# Patient Record
Sex: FEMALE
[De-identification: names, ages, dates, MRNs, and addresses within clinical notes are randomized per-mention and may not be internally consistent; named-entity substitution may affect disease eponyms.]

---

## 2019-06-18 ENCOUNTER — HOSPITAL ENCOUNTER (INPATIENT)
Dept: HOSPITAL 90 - EDH | Age: 54
LOS: 4 days | Discharge: HOME | DRG: 419 | End: 2019-06-22
Attending: FAMILY MEDICINE | Admitting: FAMILY MEDICINE
Payer: OTHER GOVERNMENT

## 2019-06-18 VITALS — DIASTOLIC BLOOD PRESSURE: 70 MMHG | SYSTOLIC BLOOD PRESSURE: 140 MMHG

## 2019-06-18 VITALS — WEIGHT: 160 LBS | BODY MASS INDEX: 30.21 KG/M2 | HEIGHT: 61 IN

## 2019-06-18 DIAGNOSIS — Z83.3: ICD-10-CM

## 2019-06-18 DIAGNOSIS — Z82.49: ICD-10-CM

## 2019-06-18 DIAGNOSIS — Z80.3: ICD-10-CM

## 2019-06-18 DIAGNOSIS — K80.10: Primary | ICD-10-CM

## 2019-06-18 DIAGNOSIS — I10: ICD-10-CM

## 2019-06-18 DIAGNOSIS — Z82.3: ICD-10-CM

## 2019-06-18 DIAGNOSIS — K59.00: ICD-10-CM

## 2019-06-18 DIAGNOSIS — E03.9: ICD-10-CM

## 2019-06-18 LAB
ALBUMIN SERPL-MCNC: 4 G/DL (ref 3.5–5)
ALT SERPL-CCNC: 269 U/L (ref 12–78)
AMYLASE SERPL-CCNC: 48 U/L (ref 25–115)
AST SERPL-CCNC: 188 U/L (ref 10–37)
BASOPHILS NFR BLD AUTO: 0.4 % (ref 0–5)
BILIRUB SERPL-MCNC: 1.1 MG/DL (ref 0.2–1)
BUN SERPL-MCNC: 18 MG/DL (ref 7–18)
CHLORIDE SERPL-SCNC: 105 MMOL/L (ref 101–111)
CO2 SERPL-SCNC: 29 MMOL/L (ref 21–32)
CREAT SERPL-MCNC: 0.6 MG/DL (ref 0.5–1.5)
EOSINOPHIL NFR BLD AUTO: 1.8 % (ref 0–8)
ERYTHROCYTE [DISTWIDTH] IN BLOOD BY AUTOMATED COUNT: 14.1 % (ref 11–15.5)
GFR SERPL CREATININE-BSD FRML MDRD: 112 ML/MIN (ref 60–?)
GLUCOSE SERPL-MCNC: 100 MG/DL (ref 70–105)
HCT VFR BLD AUTO: 41.8 % (ref 36–48)
LIPASE SERPL-CCNC: 147 U/L (ref 114–286)
LYMPHOCYTES NFR SPEC AUTO: 24.6 % (ref 21–51)
MCH RBC QN AUTO: 30 PG (ref 27–33)
MCHC RBC AUTO-ENTMCNC: 33.8 G/DL (ref 32–36)
MCV RBC AUTO: 89 FL (ref 79–99)
MONOCYTES NFR BLD AUTO: 5.5 % (ref 3–13)
NEUTROPHILS NFR BLD AUTO: 67.7 % (ref 40–77)
NRBC BLD MANUAL-RTO: 0.1 % (ref 0–0.19)
PH UR STRIP: 5 [PH] (ref 5–8)
PLATELET # BLD AUTO: 201 K/UL (ref 130–400)
POTASSIUM SERPL-SCNC: 3.8 MMOL/L (ref 3.5–5.1)
PROT SERPL-MCNC: 7 G/DL (ref 6–8.3)
RBC # BLD AUTO: 4.7 MIL/UL (ref 4–5.5)
SODIUM SERPL-SCNC: 143 MMOL/L (ref 136–145)
SP GR UR STRIP: 1.01 (ref 1–1.03)
UROBILINOGEN UR STRIP-MCNC: 0.2 MG/DL (ref 0.2–1)
WBC # BLD AUTO: 4.3 K/UL (ref 4.8–10.8)

## 2019-06-18 PROCEDURE — 85610 PROTHROMBIN TIME: CPT

## 2019-06-18 PROCEDURE — 48400 INJECTION INTRAOP ADD-ON: CPT

## 2019-06-18 PROCEDURE — 36415 COLL VENOUS BLD VENIPUNCTURE: CPT

## 2019-06-18 PROCEDURE — 93005 ELECTROCARDIOGRAM TRACING: CPT

## 2019-06-18 PROCEDURE — 82150 ASSAY OF AMYLASE: CPT

## 2019-06-18 PROCEDURE — 81003 URINALYSIS AUTO W/O SCOPE: CPT

## 2019-06-18 PROCEDURE — 85025 COMPLETE CBC W/AUTO DIFF WBC: CPT

## 2019-06-18 PROCEDURE — 80053 COMPREHEN METABOLIC PANEL: CPT

## 2019-06-18 PROCEDURE — 71046 X-RAY EXAM CHEST 2 VIEWS: CPT

## 2019-06-18 PROCEDURE — 82948 REAGENT STRIP/BLOOD GLUCOSE: CPT

## 2019-06-18 PROCEDURE — 83690 ASSAY OF LIPASE: CPT

## 2019-06-18 PROCEDURE — 85730 THROMBOPLASTIN TIME PARTIAL: CPT

## 2019-06-18 PROCEDURE — 76705 ECHO EXAM OF ABDOMEN: CPT

## 2019-06-18 PROCEDURE — 74181 MRI ABDOMEN W/O CONTRAST: CPT

## 2019-06-18 PROCEDURE — 85027 COMPLETE CBC AUTOMATED: CPT

## 2019-06-18 RX ADMIN — SODIUM CHLORIDE SCH MLS/HR: 0.9 INJECTION, SOLUTION INTRAVENOUS at 21:53

## 2019-06-19 VITALS — DIASTOLIC BLOOD PRESSURE: 67 MMHG | SYSTOLIC BLOOD PRESSURE: 127 MMHG

## 2019-06-19 VITALS — DIASTOLIC BLOOD PRESSURE: 67 MMHG | SYSTOLIC BLOOD PRESSURE: 137 MMHG

## 2019-06-19 VITALS — SYSTOLIC BLOOD PRESSURE: 159 MMHG | DIASTOLIC BLOOD PRESSURE: 83 MMHG

## 2019-06-19 VITALS — SYSTOLIC BLOOD PRESSURE: 132 MMHG | DIASTOLIC BLOOD PRESSURE: 68 MMHG

## 2019-06-19 VITALS — DIASTOLIC BLOOD PRESSURE: 83 MMHG | SYSTOLIC BLOOD PRESSURE: 148 MMHG

## 2019-06-19 VITALS — DIASTOLIC BLOOD PRESSURE: 75 MMHG | SYSTOLIC BLOOD PRESSURE: 129 MMHG

## 2019-06-19 LAB
ALBUMIN SERPL-MCNC: 3.5 G/DL (ref 3.5–5)
ALT SERPL-CCNC: 297 U/L (ref 12–78)
APTT PPP: 28.4 SEC (ref 26.3–35.5)
AST SERPL-CCNC: 156 U/L (ref 10–37)
BILIRUB SERPL-MCNC: 0.7 MG/DL (ref 0.2–1)
BUN SERPL-MCNC: 15 MG/DL (ref 7–18)
CHLORIDE SERPL-SCNC: 106 MMOL/L (ref 101–111)
CO2 SERPL-SCNC: 27 MMOL/L (ref 21–32)
CREAT SERPL-MCNC: 0.7 MG/DL (ref 0.5–1.5)
ERYTHROCYTE [DISTWIDTH] IN BLOOD BY AUTOMATED COUNT: 14.6 % (ref 11–15.5)
GFR SERPL CREATININE-BSD FRML MDRD: 93 ML/MIN (ref 60–?)
GLUCOSE SERPL-MCNC: 83 MG/DL (ref 70–105)
HCT VFR BLD AUTO: 38.7 % (ref 36–48)
INR PPP: 1.03 (ref 0.85–1.15)
MCH RBC QN AUTO: 29.6 PG (ref 27–33)
MCHC RBC AUTO-ENTMCNC: 33.5 G/DL (ref 32–36)
MCV RBC AUTO: 88.2 FL (ref 79–99)
NRBC BLD MANUAL-RTO: 0 % (ref 0–0.19)
PLATELET # BLD AUTO: 202 K/UL (ref 130–400)
POTASSIUM SERPL-SCNC: 3.5 MMOL/L (ref 3.5–5.1)
PROT SERPL-MCNC: 6.6 G/DL (ref 6–8.3)
PROTHROMBIN TIME: 10.8 SEC (ref 9.6–11.6)
RBC # BLD AUTO: 4.39 MIL/UL (ref 4–5.5)
SODIUM SERPL-SCNC: 142 MMOL/L (ref 136–145)
WBC # BLD AUTO: 3.6 K/UL (ref 4.8–10.8)

## 2019-06-19 RX ADMIN — DEXTROSE AND SODIUM CHLORIDE SCH MLS/HR: 5; .45 INJECTION, SOLUTION INTRAVENOUS at 17:23

## 2019-06-19 RX ADMIN — METRONIDAZOLE SCH MLS/HR: 500 SOLUTION INTRAVENOUS at 17:22

## 2019-06-19 RX ADMIN — SODIUM CHLORIDE SCH MLS/HR: 0.9 INJECTION, SOLUTION INTRAVENOUS at 01:30

## 2019-06-19 RX ADMIN — SODIUM CHLORIDE SCH MLS/HR: 0.9 INJECTION, SOLUTION INTRAVENOUS at 20:30

## 2019-06-19 RX ADMIN — SODIUM CHLORIDE SCH MLS/HR: 0.9 INJECTION, SOLUTION INTRAVENOUS at 20:35

## 2019-06-19 NOTE — NUR
PA OF DR. MOONEY (JONATHON), NOTIFIED OF PT HAVING PAIN

STATES, "OKAY WILL NOTIFY DR. DUNHAM OF PT HAVING PAIN AND WILL ASSESS PT TONIGHT AFTER DONE 
WITH SURGERIES.

## 2019-06-19 NOTE — NUR
PT COMPLAINING OF ABD PAIN 8/10, DR. ANY BAIN, STATES;

TO ODELL DUNHAM OF PT HAVING PAIN, AND DONT DISCHARGE PATIENT TODAY, PT IS HAVING PAIN 
AND IS NPO.

## 2019-06-19 NOTE — NUR
INITIAL

MET W PT, FAMILY AT B/SIDE PT IN PAIN, ADMITTED WITH CHOLELITHIASIS, SURG CONSULT PENDING, 

INDP,NO DME, UNEMPLOYED, LIVES W FAMILY/SPOUSE, DCP IS HOME 

-------------------------------------------------------------------------------

Addendum: 06/22/19 at 0814 by RICKEY BECKMAN RN 

-------------------------------------------------------------------------------

Amended: Links added.

## 2019-06-19 NOTE — NUR
DR. DUNHAM'S PA (KATHRINE IRWIN) CALLED BACK

STATES WILL NOT DO SURGERY AND TO FOLLOW UP THIS FRIDAY FOR POSSIBLE OUT PATIENT SX. MAY 
START PT ON A REGULAR LOW FAT DIET.

## 2019-06-20 VITALS — SYSTOLIC BLOOD PRESSURE: 146 MMHG | DIASTOLIC BLOOD PRESSURE: 84 MMHG

## 2019-06-20 VITALS — SYSTOLIC BLOOD PRESSURE: 126 MMHG | DIASTOLIC BLOOD PRESSURE: 78 MMHG

## 2019-06-20 VITALS — DIASTOLIC BLOOD PRESSURE: 72 MMHG | SYSTOLIC BLOOD PRESSURE: 136 MMHG

## 2019-06-20 VITALS — DIASTOLIC BLOOD PRESSURE: 78 MMHG | SYSTOLIC BLOOD PRESSURE: 138 MMHG

## 2019-06-20 VITALS — DIASTOLIC BLOOD PRESSURE: 83 MMHG | SYSTOLIC BLOOD PRESSURE: 138 MMHG

## 2019-06-20 VITALS — DIASTOLIC BLOOD PRESSURE: 72 MMHG | SYSTOLIC BLOOD PRESSURE: 134 MMHG

## 2019-06-20 LAB
ALBUMIN SERPL-MCNC: 3.7 G/DL (ref 3.5–5)
ALT SERPL-CCNC: 199 U/L (ref 12–78)
AST SERPL-CCNC: 53 U/L (ref 10–37)
BILIRUB SERPL-MCNC: 1.1 MG/DL (ref 0.2–1)
BUN SERPL-MCNC: 11 MG/DL (ref 7–18)
CHLORIDE SERPL-SCNC: 105 MMOL/L (ref 101–111)
CO2 SERPL-SCNC: 27 MMOL/L (ref 21–32)
CREAT SERPL-MCNC: 0.7 MG/DL (ref 0.5–1.5)
EOSINOPHIL NFR BLD MANUAL: 4 % (ref 1–6)
ERYTHROCYTE [DISTWIDTH] IN BLOOD BY AUTOMATED COUNT: 14 % (ref 11–15.5)
ERYTHROCYTE [DISTWIDTH] IN BLOOD BY AUTOMATED COUNT: 14.3 % (ref 11–15.5)
GFR SERPL CREATININE-BSD FRML MDRD: 93 ML/MIN (ref 60–?)
GLUCOSE SERPL-MCNC: 159 MG/DL (ref 70–105)
HCT VFR BLD AUTO: 39.5 % (ref 36–48)
HCT VFR BLD AUTO: 40.2 % (ref 36–48)
LYMPHOCYTES NFR BLD MANUAL: 32 % (ref 22–44)
MANUAL DIF COMMENT BLD-IMP: (no result)
MCH RBC QN AUTO: 30.4 PG (ref 27–33)
MCH RBC QN AUTO: 30.4 PG (ref 27–33)
MCHC RBC AUTO-ENTMCNC: 34.3 G/DL (ref 32–36)
MCHC RBC AUTO-ENTMCNC: 34.5 G/DL (ref 32–36)
MCV RBC AUTO: 88.3 FL (ref 79–99)
MCV RBC AUTO: 88.4 FL (ref 79–99)
MONOCYTES NFR BLD MANUAL: 4 % (ref 2–9)
NEUTS SEG NFR BLD MANUAL: 60 % (ref 40–70)
NRBC BLD MANUAL-RTO: 0.2 % (ref 0–0.19)
NRBC BLD MANUAL-RTO: 0.2 % (ref 0–0.19)
PLAT MORPH BLD: ADEQUATE
PLATELET # BLD AUTO: 201 K/UL (ref 130–400)
PLATELET # BLD AUTO: 203 K/UL (ref 130–400)
POTASSIUM SERPL-SCNC: 3.5 MMOL/L (ref 3.5–5.1)
PROT SERPL-MCNC: 7 G/DL (ref 6–8.3)
RBC # BLD AUTO: 4.48 MIL/UL (ref 4–5.5)
RBC # BLD AUTO: 4.55 MIL/UL (ref 4–5.5)
RBC MORPH BLD: (no result)
SODIUM SERPL-SCNC: 140 MMOL/L (ref 136–145)
WBC # BLD AUTO: 2.9 K/UL (ref 4.8–10.8)
WBC # BLD AUTO: 3 K/UL (ref 4.8–10.8)

## 2019-06-20 RX ADMIN — METRONIDAZOLE SCH MLS/HR: 500 SOLUTION INTRAVENOUS at 14:00

## 2019-06-20 RX ADMIN — SODIUM CHLORIDE SCH MLS/HR: 0.9 INJECTION, SOLUTION INTRAVENOUS at 09:30

## 2019-06-20 RX ADMIN — METRONIDAZOLE SCH MLS/HR: 500 SOLUTION INTRAVENOUS at 06:00

## 2019-06-20 RX ADMIN — METRONIDAZOLE SCH MLS/HR: 500 SOLUTION INTRAVENOUS at 02:14

## 2019-06-20 RX ADMIN — METRONIDAZOLE SCH MLS/HR: 500 SOLUTION INTRAVENOUS at 21:14

## 2019-06-20 RX ADMIN — DEXTROSE AND SODIUM CHLORIDE SCH MLS/HR: 5; .45 INJECTION, SOLUTION INTRAVENOUS at 18:15

## 2019-06-20 RX ADMIN — DEXTROSE AND SODIUM CHLORIDE SCH MLS/HR: 5; .45 INJECTION, SOLUTION INTRAVENOUS at 02:15

## 2019-06-20 NOTE — NUR
MD MITZI NORRIS VISITED WITH PATIENT. NEW ORDERS RECEIVED AND CARRIED OUT. VITALS STABLE. AFEBRILE. 
TOLERATING IVF WELL. NO NAUSEA OR VOMITING NOTED AT THIS TIME. SON AT BEDSIDE. CALL LIGHT 
WITHIN REACH. WILL CONTINUE TO BE OBSERVED. 

-------------------------------------------------------------------------------

Addendum: 06/20/19 at 0921 by GUERITA MERRITT RN RN

-------------------------------------------------------------------------------

Amended: Links added.

## 2019-06-21 VITALS — SYSTOLIC BLOOD PRESSURE: 140 MMHG | DIASTOLIC BLOOD PRESSURE: 62 MMHG

## 2019-06-21 VITALS — SYSTOLIC BLOOD PRESSURE: 120 MMHG | DIASTOLIC BLOOD PRESSURE: 61 MMHG

## 2019-06-21 VITALS — DIASTOLIC BLOOD PRESSURE: 72 MMHG | SYSTOLIC BLOOD PRESSURE: 121 MMHG

## 2019-06-21 VITALS — SYSTOLIC BLOOD PRESSURE: 132 MMHG | DIASTOLIC BLOOD PRESSURE: 66 MMHG

## 2019-06-21 VITALS — DIASTOLIC BLOOD PRESSURE: 65 MMHG | SYSTOLIC BLOOD PRESSURE: 123 MMHG

## 2019-06-21 VITALS — DIASTOLIC BLOOD PRESSURE: 60 MMHG | SYSTOLIC BLOOD PRESSURE: 117 MMHG

## 2019-06-21 VITALS — SYSTOLIC BLOOD PRESSURE: 126 MMHG | DIASTOLIC BLOOD PRESSURE: 64 MMHG

## 2019-06-21 VITALS — DIASTOLIC BLOOD PRESSURE: 75 MMHG | SYSTOLIC BLOOD PRESSURE: 134 MMHG

## 2019-06-21 VITALS — DIASTOLIC BLOOD PRESSURE: 73 MMHG | SYSTOLIC BLOOD PRESSURE: 141 MMHG

## 2019-06-21 VITALS — DIASTOLIC BLOOD PRESSURE: 61 MMHG | SYSTOLIC BLOOD PRESSURE: 122 MMHG

## 2019-06-21 VITALS — DIASTOLIC BLOOD PRESSURE: 89 MMHG | SYSTOLIC BLOOD PRESSURE: 142 MMHG

## 2019-06-21 VITALS — SYSTOLIC BLOOD PRESSURE: 130 MMHG | DIASTOLIC BLOOD PRESSURE: 72 MMHG

## 2019-06-21 VITALS — DIASTOLIC BLOOD PRESSURE: 92 MMHG | SYSTOLIC BLOOD PRESSURE: 131 MMHG

## 2019-06-21 VITALS — SYSTOLIC BLOOD PRESSURE: 128 MMHG | DIASTOLIC BLOOD PRESSURE: 66 MMHG

## 2019-06-21 VITALS — SYSTOLIC BLOOD PRESSURE: 135 MMHG | DIASTOLIC BLOOD PRESSURE: 73 MMHG

## 2019-06-21 VITALS — DIASTOLIC BLOOD PRESSURE: 62 MMHG | SYSTOLIC BLOOD PRESSURE: 116 MMHG

## 2019-06-21 VITALS — SYSTOLIC BLOOD PRESSURE: 149 MMHG | DIASTOLIC BLOOD PRESSURE: 64 MMHG

## 2019-06-21 VITALS — SYSTOLIC BLOOD PRESSURE: 141 MMHG | DIASTOLIC BLOOD PRESSURE: 70 MMHG

## 2019-06-21 VITALS — DIASTOLIC BLOOD PRESSURE: 58 MMHG | SYSTOLIC BLOOD PRESSURE: 138 MMHG

## 2019-06-21 VITALS — SYSTOLIC BLOOD PRESSURE: 116 MMHG | DIASTOLIC BLOOD PRESSURE: 72 MMHG

## 2019-06-21 VITALS — SYSTOLIC BLOOD PRESSURE: 142 MMHG | DIASTOLIC BLOOD PRESSURE: 68 MMHG

## 2019-06-21 VITALS — SYSTOLIC BLOOD PRESSURE: 131 MMHG | DIASTOLIC BLOOD PRESSURE: 76 MMHG

## 2019-06-21 VITALS — DIASTOLIC BLOOD PRESSURE: 74 MMHG | SYSTOLIC BLOOD PRESSURE: 131 MMHG

## 2019-06-21 VITALS — SYSTOLIC BLOOD PRESSURE: 120 MMHG | DIASTOLIC BLOOD PRESSURE: 62 MMHG

## 2019-06-21 PROCEDURE — BF131ZZ FLUOROSCOPY OF GALLBLADDER AND BILE DUCTS USING LOW OSMOLAR CONTRAST: ICD-10-PCS | Performed by: SURGERY

## 2019-06-21 PROCEDURE — 0FT44ZZ RESECTION OF GALLBLADDER, PERCUTANEOUS ENDOSCOPIC APPROACH: ICD-10-PCS | Performed by: SURGERY

## 2019-06-21 RX ADMIN — METRONIDAZOLE SCH MLS/HR: 500 SOLUTION INTRAVENOUS at 15:48

## 2019-06-21 RX ADMIN — DEXTROSE AND SODIUM CHLORIDE SCH MLS/HR: 5; .45 INJECTION, SOLUTION INTRAVENOUS at 02:15

## 2019-06-21 RX ADMIN — DEXTROSE AND SODIUM CHLORIDE SCH MLS/HR: 5; .45 INJECTION, SOLUTION INTRAVENOUS at 18:15

## 2019-06-21 RX ADMIN — METRONIDAZOLE SCH MLS/HR: 500 SOLUTION INTRAVENOUS at 05:26

## 2019-06-21 RX ADMIN — METRONIDAZOLE SCH MLS/HR: 500 SOLUTION INTRAVENOUS at 21:42

## 2019-06-21 RX ADMIN — DEXTROSE AND SODIUM CHLORIDE SCH MLS/HR: 5; .45 INJECTION, SOLUTION INTRAVENOUS at 05:37

## 2019-06-21 RX ADMIN — DEXTROSE AND SODIUM CHLORIDE SCH MLS/HR: 5; .45 INJECTION, SOLUTION INTRAVENOUS at 10:15

## 2019-06-22 VITALS — DIASTOLIC BLOOD PRESSURE: 76 MMHG | SYSTOLIC BLOOD PRESSURE: 142 MMHG

## 2019-06-22 VITALS — SYSTOLIC BLOOD PRESSURE: 143 MMHG | DIASTOLIC BLOOD PRESSURE: 75 MMHG

## 2019-06-22 RX ADMIN — DEXTROSE AND SODIUM CHLORIDE SCH MLS/HR: 5; .45 INJECTION, SOLUTION INTRAVENOUS at 00:17

## 2019-06-22 NOTE — NUR
DC

PATIENT AND SPOUSE GIVEN DISCHARGE INSTRUCTIONS, APPT DATES AND TIMES WITH DR DUNHAM AND DR AGARWAL. BOTH ABLE TO VERBALIZE BACK UNDERSTANDING TO ALL DC INSTRUCTIONS. PIV CATHETER 
REMOVED INTACT FROM RT HAND. ALL PERSONAL BELONGINGS TAKEN WITH PATIENT TO PRIVATE VEHICLE. 
NO DISTRESS NOT OR VOICED.